# Patient Record
Sex: FEMALE | Race: WHITE
[De-identification: names, ages, dates, MRNs, and addresses within clinical notes are randomized per-mention and may not be internally consistent; named-entity substitution may affect disease eponyms.]

---

## 2017-08-24 ENCOUNTER — HOSPITAL ENCOUNTER (OUTPATIENT)
Dept: HOSPITAL 45 - C.CTS | Age: 37
Discharge: HOME | End: 2017-08-24
Attending: FAMILY MEDICINE
Payer: COMMERCIAL

## 2017-08-24 DIAGNOSIS — R10.31: Primary | ICD-10-CM

## 2017-08-24 NOTE — DIAGNOSTIC IMAGING REPORT
CT SCAN OF THE ABDOMEN AND PELVIS WITH IV CONTRAST



CLINICAL HISTORY:   Right lower quadrant abdominal pain.



COMPARISON STUDY:  No priors.



TECHNIQUE: Following the IV administration of  120 cc of Optiray 320, CT scan of

the abdomen and pelvis is performed from the lung bases to the proximal femora.

Images are reviewed in the axial, sagittal, and coronal planes. IV contrast was

administered without complication. Automated dose control exposure was utilized.

A dose lowering technique was utilized adhering to the principles of ALARA. 



CT DOSE: 1984.20 mGy.cm



FINDINGS:



Lung bases: The heart is normal in size and without pericardial effusion. The

lung bases are clear.



Liver: The contrast-enhanced liver is enlarged, measuring 24 cm in length.  The

liver demonstrates diffusely diminished attenuation consistent with hepatic

steatosis. There is no intrahepatic biliary ductal dilatation. The hepatic veins

and portal veins are patent.



Gallbladder: Surgically absent noting clips in the gallbladder fossa.



Spleen: Enlarged, measuring 17 cm in length.



Pancreas: Unremarkable.



Adrenal glands: Unremarkable.



Kidneys: The contrast enhanced kidneys are normal in size and without

hydronephrosis. The kidneys enhance symmetrically.



Abdominal vasculature: The abdominal aorta is normal in course and caliber.



Bowel: The small bowel and colon are normal in course and caliber. The appendix

is  well visualized and normal.



Peritoneum: There is no intraperitoneal free air or abdominal ascites.



Lymphadenopathy: None.



Pelvic viscera: The bladder, uterus, and adnexa are normal as visualized. 

Ovarian follicles are noted.



Skeletal structures: No lytic or blastic lesions are seen. Sclerotic change is

seen in the pubic symphysis.





IMPRESSION:



1. There are no acute infectious or inflammatory findings in the abdomen or

pelvis.



2. Hepatomegaly and hepatic steatosis.



3. Splenomegaly.



4. Additional findings as above.







Electronically signed by:  Mauricio Sauceda M.D.

8/24/2017 6:17 PM



Dictated Date/Time:  8/24/2017 6:07 PM

## 2018-04-04 ENCOUNTER — HOSPITAL ENCOUNTER (EMERGENCY)
Dept: HOSPITAL 45 - C.EDB | Age: 38
Discharge: HOME | End: 2018-04-04
Payer: COMMERCIAL

## 2018-04-04 VITALS — HEART RATE: 94 BPM | OXYGEN SATURATION: 97 % | SYSTOLIC BLOOD PRESSURE: 160 MMHG | DIASTOLIC BLOOD PRESSURE: 113 MMHG

## 2018-04-04 VITALS
HEIGHT: 69.02 IN | BODY MASS INDEX: 43.4 KG/M2 | WEIGHT: 293 LBS | WEIGHT: 293 LBS | BODY MASS INDEX: 43.4 KG/M2 | HEIGHT: 69.02 IN

## 2018-04-04 VITALS — TEMPERATURE: 98.96 F

## 2018-04-04 DIAGNOSIS — K76.0: ICD-10-CM

## 2018-04-04 DIAGNOSIS — K92.1: Primary | ICD-10-CM

## 2018-04-04 DIAGNOSIS — I10: ICD-10-CM

## 2018-04-04 DIAGNOSIS — F17.200: ICD-10-CM

## 2018-04-04 LAB
ALBUMIN SERPL-MCNC: 3.6 GM/DL (ref 3.4–5)
ALP SERPL-CCNC: 85 U/L (ref 45–117)
ALT SERPL-CCNC: 46 U/L (ref 12–78)
AST SERPL-CCNC: 32 U/L (ref 15–37)
BASOPHILS # BLD: 0.05 K/UL (ref 0–0.2)
BASOPHILS NFR BLD: 0.4 %
BUN SERPL-MCNC: 11 MG/DL (ref 7–18)
CALCIUM SERPL-MCNC: 8.8 MG/DL (ref 8.5–10.1)
CO2 SERPL-SCNC: 25 MMOL/L (ref 21–32)
CREAT SERPL-MCNC: 0.86 MG/DL (ref 0.6–1.2)
EOS ABS #: 0.29 K/UL (ref 0–0.5)
EOSINOPHIL NFR BLD AUTO: 321 K/UL (ref 130–400)
GLUCOSE SERPL-MCNC: 146 MG/DL (ref 70–99)
HCT VFR BLD CALC: 38.7 % (ref 37–47)
HGB BLD-MCNC: 12.9 G/DL (ref 12–16)
IG#: 0.07 K/UL (ref 0–0.02)
IMM GRANULOCYTES NFR BLD AUTO: 30.4 %
LIPASE: 184 U/L (ref 73–393)
LYMPHOCYTES # BLD: 3.96 K/UL (ref 1.2–3.4)
MCH RBC QN AUTO: 26.4 PG (ref 25–34)
MCHC RBC AUTO-ENTMCNC: 33.3 G/DL (ref 32–36)
MCV RBC AUTO: 79.1 FL (ref 80–100)
MONO ABS #: 0.76 K/UL (ref 0.11–0.59)
MONOCYTES NFR BLD: 5.8 %
NEUT ABS #: 7.88 K/UL (ref 1.4–6.5)
NEUTROPHILS # BLD AUTO: 2.2 %
NEUTROPHILS NFR BLD AUTO: 60.7 %
PMV BLD AUTO: 9.6 FL (ref 7.4–10.4)
POTASSIUM SERPL-SCNC: 3.5 MMOL/L (ref 3.5–5.1)
PROT SERPL-MCNC: 7.6 GM/DL (ref 6.4–8.2)
RED CELL DISTRIBUTION WIDTH CV: 13.6 % (ref 11.5–14.5)
RED CELL DISTRIBUTION WIDTH SD: 38.8 FL (ref 36.4–46.3)
SODIUM SERPL-SCNC: 136 MMOL/L (ref 136–145)
WBC # BLD AUTO: 13.01 K/UL (ref 4.8–10.8)

## 2018-04-04 NOTE — EMERGENCY ROOM VISIT NOTE
History


Report prepared by Ben:  Demetrice Segovia


Under the Supervision of:  Dr. Naresh Cotter M.D.


First contact with patient:  19:49


Chief Complaint:  OTHER COMPLAINT


Stated Complaint:  PAIN, BLOOD IN STOOL





History of Present Illness


The patient is a 37 year old female who presents to the Emergency Room with 

complaints of blood in her stool beginning at 1630 this afternoon. The patient 

reports that she had another episode of blood in her stool 45 minutes prior to 

arrival. The patient states that she called her doctor's office and was seen 

and had blood work ordered for tomorrow. She states that she was referred here 

from her doctor's office since she had 2 episodes. She states that she just did 

a 14 day course of levofloxacin for a sinus infection. She reports having nausea

, fatigue, and headaches, but denies vomiting, fevers, and chills. The patient 

reports a history of a cholecystectomy at the age of 20 and states that since 

then she has not had normal stool. The patient reports that her menstrual 

period ended 5 days ago. She reports a history of hypertension. The patient 

reports that her cousin had colon cancer.





   Source of History:  patient


   Onset:  1630 this afternoon


   Position:  other (rectum)


   Quality:  other (blood in stool)


   Associated Symptoms:  + headache, + nausea, + fatigue, No fevers, No chills, 

No vomiting





Review of Systems


See HPI for pertinent positives and negatives.  A total of ten systems were 

reviewed and were otherwise negative.





Past Medical & Surgical


Medical Problems:


(1) Hypertension


Surgical Problems:


(1) History of cholecystectomy








Family History





FH: colon cancer





Social History


Smoking Status:  Current Every Day Smoker


Marital Status:  





Current/Historical Medications


Scheduled


Azelastine Hcl (Astepro), 2 SPRY TIFFANY DAILY


Fluticasone Propionate (Nasal) (Flonase Allergy Relief), 2 SPRAYS TIFFANY QPM


Losartan Potassium (Cozaar), 50 MG PO DAILY


Montelukast Sodium (Singulair), 10 MG PO DAILY


Ondasetron Odt (Zofran Odt), 4 MG SL Q6H





Scheduled PRN


Acetaminophen/Diphenhydramine (Tylenol Pm), 2 TAB PO HS PRN for Sleep


Ibuprofen (Advil), 800 MG PO DAILY PRN for Pain or Fever


Lorazepam (Ativan), 1 MG PO DAILY PRN for Anxiety





Allergies


Coded Allergies:  


     No Known Allergies (Unverified , 4/4/18)





Physical Exam


Vital Signs











  Date Time  Temp Pulse Resp B/P (MAP) Pulse Ox O2 Delivery O2 Flow Rate FiO2


 


4/4/18 22:42  94 20 160/113 97   


 


4/4/18 21:35  101 20 162/105 98 Room Air  


 


4/4/18 20:30  104 20 170/115 96 Room Air  


 


4/4/18 19:44 37.2 112 20 217/122 98 Room Air  











Physical Exam


GENERAL: Awake, alert, well-appearing, in no distress


HENT: Normocephalic, atraumatic. Dry mucous membranes, otherwise oropharynx 

unremarkable.


EYES: Normal conjunctiva. Sclera non-icteric.


NECK: Supple. No nuchal rigidity. FROM. No JVD.


RESPIRATORY: Clear to auscultation.


CARDIAC: Regular rate, normal rhythm. Extremities warm and well perfused. 

Pulses equal.


ABDOMEN: Soft, non-distended. No tenderness to palpation. No rebound or 

guarding. No masses.


RECTAL: No external hemorrhoids. Brown stool, guaiac negative. 


MUSCULOSKELETAL: Chest examination reveals no tenderness. The back is 

symmetrical on inspection without obvious abnormality. There is no CVA 

tenderness to palpation. No joint edema. 


LOWER EXTREMITIES: Calves are equal size bilaterally and non-tender. No edema. 

No discoloration. 


NEURO: Normal sensorium. No sensory or motor deficits noted. 


SKIN: No rash or jaundice noted.





Medical Decision & Procedures


ER Provider


Diagnostic Interpretation:


Radiology results as stated below per my review and radiologist interpretation: 





CT SCAN OF THE ABDOMEN AND PELVIS WITH IV CONTRAST





CLINICAL HISTORY:   Generalized abdominal pain. Hematochezia.





COMPARISON STUDY:  Abdominal CT dated 8/24/2017.





TECHNIQUE: Following the IV administration of  93 cc of Optiray 320, CT scan of


the abdomen and pelvis is performed from the lung bases to the proximal femora.


Images are reviewed in the axial, sagittal, and coronal planes. IV contrast was


administered without complication. Automated dose control exposure was utilized.


A dose lowering technique was utilized adhering to the principles of ALARA. 





CT DOSE: 2128.88 mGy.cm





FINDINGS:





Lung bases: The heart is normal in size and without pericardial effusion. The


lung bases are clear.





Liver: The contrast-enhanced liver is enlarged, measuring 24.2 cm in length. 


The liver demonstrates diffusely diminished attenuation consistent with hepatic


steatosis. There is no intrahepatic biliary ductal dilatation. The hepatic veins


and portal veins are patent.





Gallbladder: Surgically absent noting clips in the gallbladder fossa.





Spleen: Enlarged, measuring 17 cm in length.





Pancreas: Unremarkable.





Adrenal glands: Unremarkable.





Kidneys: The contrast enhanced kidneys are normal in size and without


hydronephrosis. The kidneys enhance symmetrically.





Abdominal vasculature: The abdominal aorta is normal in course and caliber


noting scattered foci of atherosclerotic calcification.





Bowel: The small bowel and colon are normal in course and caliber. The appendix


is  well visualized and normal.





Peritoneum: There is no intraperitoneal free air or abdominal ascites.





Lymphadenopathy: None.





Pelvic viscera: The bladder, uterus, and adnexa are normal as visualized. 


Ovarian follicles are noted.





Skeletal structures: No lytic or blastic lesions are seen. Sclerotic change is


noted in the pubic symphysis.








IMPRESSION:





1. There are no acute infectious or inflammatory findings in the abdomen or


pelvis.





2. Hepatomegaly and severe hepatic steatosis.





3. Splenomegaly.





4. Additional findings as above.











Electronically signed by:  Mauricio Sauceda M.D.


4/4/2018 9:13 PM





Dictated Date/Time:  4/4/2018 9:09 PM





Laboratory Results


4/4/18 20:12








Red Blood Count 4.89, Mean Corpuscular Volume 79.1, Mean Corpuscular Hemoglobin 

26.4, Mean Corpuscular Hemoglobin Concent 33.3, Mean Platelet Volume 9.6, 

Neutrophils (%) (Auto) 60.7, Lymphocytes (%) (Auto) 30.4, Monocytes (%) (Auto) 

5.8, Eosinophils (%) (Auto) 2.2, Basophils (%) (Auto) 0.4, Neutrophils # (Auto) 

7.88, Lymphocytes # (Auto) 3.96, Monocytes # (Auto) 0.76, Eosinophils # (Auto) 

0.29, Basophils # (Auto) 0.05





4/4/18 20:12

















Test


  4/4/18


20:12 4/4/18


20:25


 


White Blood Count


  13.01 K/uL


(4.8-10.8) 


 


 


Red Blood Count


  4.89 M/uL


(4.2-5.4) 


 


 


Hemoglobin


  12.9 g/dL


(12.0-16.0) 


 


 


Hematocrit 38.7 % (37-47)  


 


Mean Corpuscular Volume


  79.1 fL


() 


 


 


Mean Corpuscular Hemoglobin


  26.4 pg


(25-34) 


 


 


Mean Corpuscular Hemoglobin


Concent 33.3 g/dl


(32-36) 


 


 


Platelet Count


  321 K/uL


(130-400) 


 


 


Mean Platelet Volume


  9.6 fL


(7.4-10.4) 


 


 


Neutrophils (%) (Auto) 60.7 %  


 


Lymphocytes (%) (Auto) 30.4 %  


 


Monocytes (%) (Auto) 5.8 %  


 


Eosinophils (%) (Auto) 2.2 %  


 


Basophils (%) (Auto) 0.4 %  


 


Neutrophils # (Auto)


  7.88 K/uL


(1.4-6.5) 


 


 


Lymphocytes # (Auto)


  3.96 K/uL


(1.2-3.4) 


 


 


Monocytes # (Auto)


  0.76 K/uL


(0.11-0.59) 


 


 


Eosinophils # (Auto)


  0.29 K/uL


(0-0.5) 


 


 


Basophils # (Auto)


  0.05 K/uL


(0-0.2) 


 


 


RDW Standard Deviation


  38.8 fL


(36.4-46.3) 


 


 


RDW Coefficient of Variation


  13.6 %


(11.5-14.5) 


 


 


Immature Granulocyte % (Auto) 0.5 %  


 


Immature Granulocyte # (Auto)


  0.07 K/uL


(0.00-0.02) 


 


 


Erythrocyte Sedimentation Rate


  35 mm/hr


(0-21) 


 


 


Anion Gap


  8.0 mmol/L


(3-11) 


 


 


Est Creatinine Clear Calc


Drug Dose 130.8 ml/min 


  


 


 


Estimated GFR (


American) 99.3 


  


 


 


Estimated GFR (Non-


American 85.7 


  


 


 


BUN/Creatinine Ratio 12.8 (10-20)  


 


Calcium Level


  8.8 mg/dl


(8.5-10.1) 


 


 


Total Bilirubin


  0.5 mg/dl


(0.2-1) 


 


 


Direct Bilirubin


  < 0.1 mg/dl


(0-0.2) 


 


 


Aspartate Amino Transf


(AST/SGOT) 32 U/L (15-37) 


  


 


 


Alanine Aminotransferase


(ALT/SGPT) 46 U/L (12-78) 


  


 


 


Alkaline Phosphatase


  85 U/L


() 


 


 


C-Reactive Protein


  1.10 mg/dl


(0-0.29) 


 


 


Total Protein


  7.6 gm/dl


(6.4-8.2) 


 


 


Albumin


  3.6 gm/dl


(3.4-5.0) 


 


 


Lipase


  184 U/L


() 


 


 


Urine Color  YELLOW 


 


Urine Appearance  CLOUDY (CLEAR) 


 


Urine pH  5.0 (4.5-7.5) 


 


Urine Specific Gravity


  


  1.026


(1.000-1.030)


 


Urine Protein  3+ (NEG) 


 


Urine Glucose (UA)  NEG (NEG) 


 


Urine Ketones  NEG (NEG) 


 


Urine Occult Blood  TRACE (NEG) 


 


Urine Nitrite  NEG (NEG) 


 


Urine Bilirubin  NEG (NEG) 


 


Urine Urobilinogen  NEG (NEG) 


 


Urine Leukocyte Esterase  NEG (NEG) 


 


Urine WBC (Auto)  1-5 /hpf (0-5) 


 


Urine RBC (Auto)


  


  10-30 /hpf


(0-4)


 


Urine Hyaline Casts (Auto)


  


  5-10 /lpf


(0-5)


 


Urine Epithelial Cells (Auto)  >30 /lpf (0-5) 


 


Urine Bacteria (Auto)  1+ (NEG) 


 


Urine Pregnancy Test  NEG (NEG) 





Laboratory results reviewed by me





Medications Administered











 Medications


  (Trade)  Dose


 Ordered  Sig/Serene


 Route  Start Time


 Stop Time Status Last Admin


Dose Admin


 


 Sodium Chloride  1,000 ml @ 


 999 mls/hr  Q1H1M STAT


 IV  4/4/18 19:54


 4/4/18 20:54 DC 4/4/18 20:26


999 MLS/HR


 


 Ondansetron HCl


  (Zofran Inj)  4 mg  NOW  STAT


 IV  4/4/18 19:54


 4/4/18 19:57 DC 4/4/18 20:25


4 MG


 


 Sodium Chloride  1,000 ml @ 


 999 mls/hr  Q1H1M STAT


 IV  4/4/18 20:01


 4/4/18 21:01 DC 4/4/18 20:26


999 MLS/HR


 


 Acetaminophen


  (Tylenol Tab)  1,000 mg  NOW  STAT


 PO  4/4/18 21:24


 4/4/18 21:25 DC 4/4/18 21:33


1,000 MG











ED Course


1955: The patient was evaluated in room C10. A complete history and physical 

exam was performed.





2146: I checked on the patient and updated her on her results. 





2230: I reevaluated the patient. Discussed results and discharge instructions: 

She verbalized understanding and agreement. The patient is ready for discharge.





Medical Decision


I reviewed the patient's past medical history, medications, and the nursing 

notes as described above.





Differential diagnosis:


Etiologies such as diverticulosis, AVM, coagulopathy, colitis, inflammatory 

bowel disease, malignancy, Violette-Dillard tear, esophagitis, peptic ulcer disease

, variceal bleed, gastritis, epistaxis, fissure, hemorrhoids, as well as others 

were entertained.





The patient is a 37-year-old woman who presents emergency department with red 

blood per rectum per hpi.  On arrival, the patient is in no acute distress, 

afebrile, stable vital signs.  Abdomen is soft nontender nondistended.  Rectal 

exam demonstrates brown stool that is guaiac negative.  WBC 13, ESR 35, CRP 1, 

HCT 38.7.  Chemistry otherwise unremarkable.  CT abdomen and pelvis 

unremarkable for any acute findings.  Unclear etiology of the patient's report 

of red blood per rectum at this time.  Plan for outpatient follow-up with GI.  

The patient is feeling improved after IV fluids.  Findings and plan for follow-

up reviewed with patient. Patient agreeable and d/c'd per discharge 

instructions.





Medication Reconcilliation


Current Medication List:  was personally reviewed by me





Blood Pressure Screening


Patient's blood pressure:  Elevated blood pressure


Blood pressure disposition:  Referred to PCP





Impression





 Primary Impression:  


 Hematochezia





Scribe Attestation


The scribe's documentation has been prepared under my direction and personally 

reviewed by me in its entirety. I confirm that the note above accurately 

reflects all work, treatment, procedures, and medical decision making performed 

by me.





Departure Information


Dispostion


Home / Self-Care





Prescriptions





Ondasetron Odt (ZOFRAN ODT) 4 Mg Tab


4 MG SL Q6H for Nausea, #10 TAB


   Prov: Naresh Cotter M.D.         4/4/18





Referrals


No Doctor, Assigned (PCP)








Mookie Duong MD





Forms


HOME CARE DOCUMENTATION FORM,                                                 

               IMPORTANT VISIT INFORMATION, WORK / SCHOOL INSTRUCTIONS





Patient Instructions


ED Hematochezia Stable, My [facility]





Additional Instructions





Please follow up with your primary care physician as well as with 

gastroenterology, Dr. Duong, in the next 1-3 days for re-evaluation and 

possible colonoscopy.





The cause of your symptoms is unclear at this time.


Otherwise, your exam, lab results, and CT scan did not show signs of an 

emergent condition at this time.





Acetaminophen for pain and fevers as needed.


Zofran as needed for nausea.


Drink plenty of fluids to ensure hydration.





Return to the emergency department for worsening symptoms as described in the 

accompanying instructions.

## 2018-04-04 NOTE — DIAGNOSTIC IMAGING REPORT
CT SCAN OF THE ABDOMEN AND PELVIS WITH IV CONTRAST



CLINICAL HISTORY:   Generalized abdominal pain. Hematochezia.



COMPARISON STUDY:  Abdominal CT dated 8/24/2017.



TECHNIQUE: Following the IV administration of  93 cc of Optiray 320, CT scan of

the abdomen and pelvis is performed from the lung bases to the proximal femora.

Images are reviewed in the axial, sagittal, and coronal planes. IV contrast was

administered without complication. Automated dose control exposure was utilized.

A dose lowering technique was utilized adhering to the principles of ALARA. 



CT DOSE: 2128.88 mGy.cm



FINDINGS:



Lung bases: The heart is normal in size and without pericardial effusion. The

lung bases are clear.



Liver: The contrast-enhanced liver is enlarged, measuring 24.2 cm in length. 

The liver demonstrates diffusely diminished attenuation consistent with hepatic

steatosis. There is no intrahepatic biliary ductal dilatation. The hepatic veins

and portal veins are patent.



Gallbladder: Surgically absent noting clips in the gallbladder fossa.



Spleen: Enlarged, measuring 17 cm in length.



Pancreas: Unremarkable.



Adrenal glands: Unremarkable.



Kidneys: The contrast enhanced kidneys are normal in size and without

hydronephrosis. The kidneys enhance symmetrically.



Abdominal vasculature: The abdominal aorta is normal in course and caliber

noting scattered foci of atherosclerotic calcification.



Bowel: The small bowel and colon are normal in course and caliber. The appendix

is  well visualized and normal.



Peritoneum: There is no intraperitoneal free air or abdominal ascites.



Lymphadenopathy: None.



Pelvic viscera: The bladder, uterus, and adnexa are normal as visualized. 

Ovarian follicles are noted.



Skeletal structures: No lytic or blastic lesions are seen. Sclerotic change is

noted in the pubic symphysis.





IMPRESSION:



1. There are no acute infectious or inflammatory findings in the abdomen or

pelvis.



2. Hepatomegaly and severe hepatic steatosis.



3. Splenomegaly.



4. Additional findings as above.







Electronically signed by:  Mauricio Sauceda M.D.

4/4/2018 9:13 PM



Dictated Date/Time:  4/4/2018 9:09 PM